# Patient Record
Sex: FEMALE | Race: WHITE
[De-identification: names, ages, dates, MRNs, and addresses within clinical notes are randomized per-mention and may not be internally consistent; named-entity substitution may affect disease eponyms.]

---

## 2018-10-27 ENCOUNTER — HOSPITAL ENCOUNTER (EMERGENCY)
Dept: HOSPITAL 75 - ER | Age: 33
LOS: 1 days | Discharge: HOME | End: 2018-10-28
Payer: SELF-PAY

## 2018-10-27 VITALS — HEIGHT: 62 IN | WEIGHT: 130 LBS | BODY MASS INDEX: 23.92 KG/M2

## 2018-10-27 DIAGNOSIS — Z88.1: ICD-10-CM
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
## 2018-10-27 DIAGNOSIS — M79.641: ICD-10-CM

## 2018-10-27 DIAGNOSIS — S60.221A: Primary | ICD-10-CM

## 2018-10-27 DIAGNOSIS — F17.210: ICD-10-CM

## 2018-10-27 PROCEDURE — 73130 X-RAY EXAM OF HAND: CPT

## 2018-10-28 VITALS — SYSTOLIC BLOOD PRESSURE: 114 MMHG | DIASTOLIC BLOOD PRESSURE: 72 MMHG
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
## 2019-09-25 ENCOUNTER — HOSPITAL ENCOUNTER (EMERGENCY)
Dept: HOSPITAL 75 - ER | Age: 34
Discharge: HOME | End: 2019-09-25
Payer: SELF-PAY

## 2019-09-25 VITALS — WEIGHT: 125.22 LBS | HEIGHT: 63.78 IN | BODY MASS INDEX: 21.64 KG/M2

## 2019-09-25 VITALS — DIASTOLIC BLOOD PRESSURE: 81 MMHG | SYSTOLIC BLOOD PRESSURE: 126 MMHG

## 2019-09-25 DIAGNOSIS — F17.210: ICD-10-CM

## 2019-09-25 DIAGNOSIS — M50.123: Primary | ICD-10-CM

## 2019-09-25 DIAGNOSIS — Z88.1: ICD-10-CM

## 2019-09-25 LAB
BARBITURATES UR QL: NEGATIVE
BENZODIAZ UR QL SCN: NEGATIVE
COCAINE UR QL: NEGATIVE
METHADONE UR QL SCN: NEGATIVE
METHAMPHETAMINE SCREEN URINE S: NEGATIVE
OPIATES UR QL SCN: NEGATIVE
OXYCODONE UR QL: NEGATIVE
PROPOXYPH UR QL: NEGATIVE
TRICYCLICS UR QL SCN: NEGATIVE

## 2019-09-25 PROCEDURE — 72125 CT NECK SPINE W/O DYE: CPT

## 2019-09-25 PROCEDURE — 80306 DRUG TEST PRSMV INSTRMNT: CPT

## 2019-09-25 PROCEDURE — 73030 X-RAY EXAM OF SHOULDER: CPT

## 2019-09-25 PROCEDURE — 84703 CHORIONIC GONADOTROPIN ASSAY: CPT

## 2019-09-25 NOTE — ED NECK-BACK PAIN/INJURY
General


Chief Complaint:  Back Problems


Stated Complaint:  BACK PAIN


Nursing Triage Note:  


Pt to triage with C/O upper back pain/left shoulder pain x 1.5 wks. Pt reports 


going to [facility] urgent care on 19, prescribed prednisone, flexiril and 


alieve. Pt reports pain unrelieved. Pt then went to urgent care again 19, 


pt got a chest x-ray which was unremarkable, instructed to d/c prednisone, 


recieved a steriod shot. Pt rates pain 9/10 at this time, no Hx of chronic back 


pain.


Nursing Sepsis Screen:  No Definite Risk





History of Present Illness


Date Seen by Provider:  Sep 25, 2019


Time Seen by Provider:  11:20


Initial Comments


34 year old female reports back, rhomboid and shoulder pain for 10 days. She has

been seen twice over the last day at urgent care, she is received prednisone, 

Flexeril, naproxen 500 mg and a steroid shot. She has had no improvement in her 

symptoms. She denies an injury. She is right-hand dominant.


Location:  C-Spine, Paraspinous Muscles


Timing/Duration:  1 Week


Severity:  Moderate


Pain/Injury Location:  Upper Extremity (left ), Neck


Radiation:  Other (left arm )


Method of Injury:  Unknown (Awoke with pain on )


Associated Symptoms:  muscle spasms; No lower back pain, No loss of bladder 

control, No loss of bowel control; other (paresthesia tingling in the left upper

extremity.)





Allergies and Home Medications


Allergies


Coded Allergies:  


     tetracycline (Verified  Allergy, Unknown, 10/28/18)





Home Medications


Cyclobenzaprine HCl 10 Mg Tablet, 10 MG PO Q8H PRN for SPASMS


   Prescribed by: RAYMON AGEE on 19 1248


Naproxen 500 Mg Tablet, 500 MG PO BID


   Prescribed by: SAMMY ONEIL on 10/28/18 0112


Tramadol HCl 50 Mg Tablet, 50 MG PO Q6H PRN for PAIN


   Prescribed by: RAYMON AGEE on 19 1248





Patient Home Medication List


Home Medication List Reviewed:  Yes





Review of Systems


Constitutional:  no symptoms reported, see HPI


Genitourinary:  no symptoms reported, see HPI


Pregnant:  No


Musculoskeletal:  see HPI, muscle pain, muscle weakness (left UE), neck pain


Skin:  no symptoms reported, see HPI





All Other Systems Reviewed


Negative Unless Noted:  Yes





Past Medical-Social-Family Hx


Past Med/Social Hx:  Reviewed Nursing Past Med/Soc Hx


Patient Social History


Alcohol Use:  Rarely Uses


Recreational Drug Use:  No


Smoking Status:  Current Everyday Smoker


Type Used:  Cigarettes


2nd Hand Smoke Exposure:  Yes


Recent Foreign Travel:  No


Contact w/Someone Who Travel:  No


Recent Infectious Disease Expo:  No


Recent Hopitalizations:  No


Physical Abuse:  No


Sexual Abuse:  No


Mistreated:  No


Fear:  No





Seasonal Allergies


Seasonal Allergies:  No





Past Medical History


Surgeries:  No


Respiratory:  No


Cardiac:  No


Neurological:  No


Reproductive Disorders:  No


Genitourinary:  No


Gastrointestinal:  No


Musculoskeletal:  No


Endocrine:  No


HEENT:  No


Cancer:  No


Psychosocial:  No


Integumentary:  No


Blood Disorders:  No





Physical Exam


Vital Signs





Vital Signs - First Documented








 19





 10:59


 


Temp 36.9


 


Pulse 90


 


Resp 19


 


B/P (MAP) 124/83 (97)


 


Pulse Ox 99


 


O2 Delivery Room Air





Capillary Refill : Less Than 3 Seconds


Height, Weight, BMI


Height: 5'2.00"


Weight: 130lbs. oz. 58.352096ui; 21.00 BMI


Method:Stated


General Appearance:  No Apparent Distress, WD/WN


HEENT:  PERRL/EOMI, TMs Normal, Normal ENT Inspection, Pharynx Normal


Neck:  Normal Inspection; No Carotid Bruit; Limited Range of Motion, Tender 

Lateral (left), Tender Midline


Cardiovascular:  Regular Rate, Rhythm, No Edema, No Murmur, Normal Peripheral 

Pulses


Respiratory:  Chest Non Tender, Lungs Clear, Normal Breath Sounds


Gastrointestinal:  Normal Bowel Sounds, Non Tender, Soft


Neurologic/Psychiatric:  Alert, Oriented x3, No Motor/Sensory Deficits, Normal 

Mood/Affect


Skin:  Normal Color, Warm/Dry





Progress/Results/Core Measures


Results/Orders


Lab Results





Laboratory Tests








Test


 19


11:33 Range/Units


 


 


Urine Opiates Screen NEGATIVE  NEGATIVE  


 


Urine Oxycodone Screen NEGATIVE  NEGATIVE  


 


Urine Methadone Screen NEGATIVE  NEGATIVE  


 


Urine Propoxyphene Screen NEGATIVE  NEGATIVE  


 


Urine Barbiturates Screen NEGATIVE  NEGATIVE  


 


Ur Tricyclic Antidepressants


Screen NEGATIVE 


 NEGATIVE  





 


Urine Phencyclidine Screen NEGATIVE  NEGATIVE  


 


Urine Amphetamines Screen NEGATIVE  NEGATIVE  


 


Urine Methamphetamines Screen NEGATIVE  NEGATIVE  


 


Urine Benzodiazepines Screen NEGATIVE  NEGATIVE  


 


Urine Cocaine Screen NEGATIVE  NEGATIVE  


 


Urine Cannabinoids Screen NEGATIVE  NEGATIVE  








My Orders





Orders - ANUEL,RAYMON ARNP


Orphenadrine Injection (Norflex Injectio (19 11:30)


Ketorolac Injection (Toradol Injection) (19 11:30)


Drug Screen Stat (Urine) (19 11:30)


Urine Pregnancy Bedside (19 11:30)


Ct Cervical Spine Wo (19 11:30)


Shoulder, Left, 3 Views (19 11:30)


Tramadol Tablet (Ultram Tablet) (19 12:45)





Medications Given in ED





Current Medications








 Medications  Dose


 Ordered  Sig/Dinesh


 Route  Start Time


 Stop Time Status Last Admin


Dose Admin


 


 Ketorolac


 Tromethamine  30 mg  ONCE  ONCE


 IM  19 11:30


 19 11:33 DC 19 11:43


30 MG


 


 Orphenadrine


 Citrate  60 mg  ONCE  ONCE


 IM  19 11:30


 19 11:33 DC 19 11:42


60 MG


 


 Tramadol HCl  50 mg  ONCE  ONCE


 PO  19 12:45


 19 12:46 DC 19 12:40


50 MG








Vital Signs/I&O











 19





 10:59 12:51


 


Temp 36.9 36.9


 


Pulse 90 90


 


Resp  19


 


B/P (MAP) 124/83 (97) 126/81 (97)


 


Pulse Ox 99 100


 


O2 Delivery Room Air Simple Mask














Blood Pressure Mean:                    97











Progress


Progress Note :  


   Time:  11:20


Progress Note


Patient seen and evaluated, will give Norflex 60 milligrams IM, Toradol 30 mg IM

and obtained a CT of the cervical spine and x-rays of the left shoulder.


1200 patient reports trace improvement in her symptoms since the Norflex and 

Toradol. Awaiting CT results.


1215 CT results show a small disc protrusion at C6 7 the left, x-rays of the 

left shoulder negative. Results discussed with the patient. We'll give tramadol 

50 mg orally for pain.


1230 discharge instructions and return precautions reviewed with the patient.





Diagnostic Imaging





   Diagonstic Imaging:  Xray


Comments


NAME:   ANRENDRA YOUSSEF JOYCE


MED REC#:   [id number]


ACCOUNT#:   [id number]


PT STATUS:   REG ER


:   1985


PHYSICIAN:   RAYMON AGEE


ADMIT DATE:   19/ER


                                   ***Draft***


Date of Exam:19





SHOULDER, LEFT, 3 VIEWS








INDICATION:  Waking to left shoulder pain x2 weeks.





TECHNIQUE: Three  views of the  left shoulder  





CORRELATION STUDY:  None





FINDINGS: 


The glenohumeral and acromioclavicular alignment are maintained


and unremarkable. There is no evidence for acute fracture or


dislocation.  The visualized soft tissues are unremarkable.





IMPRESSION: 


1.  Unremarkable examination left shoulder.     





  Dictated on workstation # [id number]








Dict:   19 1212


Trans:   193


 [id number]





Interpreted by:     DISHA RILEY DO


Electronically signed by:


   Reviewed:  Reviewed by Me








   Diagonstic Imaging:  CT


   Plain Films/CT/US/NM/MRI:  c-spine


Comments


NAME:   NARENDRA YOUSSEF


MED REC#:   [id number]


ACCOUNT#:   [id number]


PT STATUS:   REG ER


:   1985


PHYSICIAN:   RAYMON AGEE


ADMIT DATE:   19/ER


                                   ***Draft***


Date of Exam:19





CT CERVICAL SPINE WO








PROCEDURE:  CT cervical spine without contrast.





TECHNIQUE: Multiple contiguous axial images were obtained through


the cervical spine without the use of intravenous contrast.


Sagittal and coronal reformations were then performed. Auto


Exposure Controls were utilized during the CT exam to meet ALARA


standards for radiation dose reduction. 





INDICATION:  Neck pain 2 weeks' history, no known discrete


injury.





Focal left paramedian posterior disc protrusion at the C6-C7


level impinging upon the left lateral recess as well as indenting


the left ventral thecal sac and resulting in at least moderate


stenosis of the left-sided foramen at C6-C7. There is spondylosis


with disc space narrowing and mild endplate osteophytes at that


level however the bony hypertrophy did not contribute


substantially to the stenosis. The remaining levels were


unremarkable. There is no fracture and no bony destruction. 





IMPRESSION:


Left paramedian disc protrusion and herniation C6-C7 with central


canal, left lateral recess and left foraminal stenosis. No acute


bony abnormality.














  Dictated on workstation # [id number]








Dict:   19 1212


Trans:   19 1223


EUGENE [id number]





Interpreted by:     IVÁN PARSONS


   Reviewed:  Reviewed by Me





Departure


Impression





   Primary Impression:  


   Cervical radiculopathy


   Additional Impression:  


   Herniated nucleus pulposus, C6-7 left


Disposition:  01 HOME, SELF-CARE


Condition:  Improved





Departure-Patient Inst.


Decision time for Depature:  12:30


Referrals:  


NO,LOCAL PHYSICIAN (PCP/Family)


Primary Care Physician


Patient Instructions:  Generalized Neck Pain (DC), Herniated Disc (DC)





Add. Discharge Instructions:  


Alternate heat and ice to your next 20 minutes at a time.


Consider evaluation by a chiropractor.


Continue to take the Naproxen 500 mg twice daily with food.


Take Flexeril 10 mg as needed every 8 hours for muscle spasms. 


Use Tylenol 650 mg every 8 hours for pain. 


For worsening pain, Ultram 50 mg every 8 hours. 


Use Tiger Balm rub or patches to neck, left shoulder blade. 


Follow up with your Primary Care Provider. 


Call [facility] for an appt with Dr. Trivedi or [facility]: Dr. Li

or Jeny. 


Return to emergency department for new, urgent health care needs.





All discharge instructions reviewed with patient and/or family. Voiced 

understanding.


Scripts


Tramadol HCl (Tramadol HCl) 50 Mg Tablet


50 MG PO Q6H PRN for PAIN, #30 TAB 0 Refills


   Prov: RAYMON AGEE         19 


Cyclobenzaprine HCl (Cyclobenzaprine HCl) 10 Mg Tablet


10 MG PO Q8H PRN for SPASMS, #30 TAB 0 Refills


   Prov: RAYMON AGEE         19











RAYMON AGEE                  Sep 25, 2019 11:40

## 2019-09-25 NOTE — DIAGNOSTIC IMAGING REPORT
INDICATION:  Waking to left shoulder pain x2 weeks.



TECHNIQUE: Three  views of the  left shoulder  



CORRELATION STUDY:  None



FINDINGS: 

The glenohumeral and acromioclavicular alignment are maintained

and unremarkable. There is no evidence for acute fracture or

dislocation.  The visualized soft tissues are unremarkable.



IMPRESSION: 

1.  Unremarkable examination left shoulder.     



Dictated by: 



  Dictated on workstation # [id number]

## 2019-09-25 NOTE — DIAGNOSTIC IMAGING REPORT
PROCEDURE:  CT cervical spine without contrast.



TECHNIQUE: Multiple contiguous axial images were obtained through

the cervical spine without the use of intravenous contrast.

Sagittal and coronal reformations were then performed. Auto

Exposure Controls were utilized during the CT exam to meet ALARA

standards for radiation dose reduction. 



INDICATION:  Neck pain 2 weeks' history, no known discrete

injury.



Focal left paramedian posterior disc protrusion at the C6-C7

level impinging upon the left lateral recess as well as indenting

the left ventral thecal sac and resulting in at least moderate

stenosis of the left-sided foramen at C6-C7. There is spondylosis

with disc space narrowing and mild endplate osteophytes at that

level however the bony hypertrophy did not contribute

substantially to the stenosis. The remaining levels were

unremarkable. There is no fracture and no bony destruction. 



IMPRESSION:

Left paramedian disc protrusion and herniation C6-C7 with central

canal, left lateral recess and left foraminal stenosis. No acute

bony abnormality.







Dictated by: 



  Dictated on workstation # [id number]

## 2022-03-03 ENCOUNTER — HOSPITAL ENCOUNTER (INPATIENT)
Dept: HOSPITAL 75 - LDRP | Age: 37
LOS: 2 days | Discharge: HOME | End: 2022-03-05
Attending: OBSTETRICS & GYNECOLOGY | Admitting: OBSTETRICS & GYNECOLOGY
Payer: COMMERCIAL

## 2022-03-03 VITALS — SYSTOLIC BLOOD PRESSURE: 113 MMHG | DIASTOLIC BLOOD PRESSURE: 60 MMHG

## 2022-03-03 VITALS — SYSTOLIC BLOOD PRESSURE: 132 MMHG | DIASTOLIC BLOOD PRESSURE: 93 MMHG

## 2022-03-03 VITALS — SYSTOLIC BLOOD PRESSURE: 116 MMHG | DIASTOLIC BLOOD PRESSURE: 56 MMHG

## 2022-03-03 VITALS — SYSTOLIC BLOOD PRESSURE: 99 MMHG | DIASTOLIC BLOOD PRESSURE: 57 MMHG

## 2022-03-03 VITALS — DIASTOLIC BLOOD PRESSURE: 44 MMHG | SYSTOLIC BLOOD PRESSURE: 74 MMHG

## 2022-03-03 VITALS — SYSTOLIC BLOOD PRESSURE: 128 MMHG | DIASTOLIC BLOOD PRESSURE: 72 MMHG

## 2022-03-03 VITALS — SYSTOLIC BLOOD PRESSURE: 100 MMHG | DIASTOLIC BLOOD PRESSURE: 57 MMHG

## 2022-03-03 VITALS — SYSTOLIC BLOOD PRESSURE: 123 MMHG | DIASTOLIC BLOOD PRESSURE: 68 MMHG

## 2022-03-03 VITALS — DIASTOLIC BLOOD PRESSURE: 59 MMHG | SYSTOLIC BLOOD PRESSURE: 112 MMHG

## 2022-03-03 VITALS — SYSTOLIC BLOOD PRESSURE: 105 MMHG | DIASTOLIC BLOOD PRESSURE: 60 MMHG

## 2022-03-03 VITALS — SYSTOLIC BLOOD PRESSURE: 134 MMHG | DIASTOLIC BLOOD PRESSURE: 64 MMHG

## 2022-03-03 VITALS — DIASTOLIC BLOOD PRESSURE: 56 MMHG | SYSTOLIC BLOOD PRESSURE: 107 MMHG

## 2022-03-03 VITALS — DIASTOLIC BLOOD PRESSURE: 78 MMHG | SYSTOLIC BLOOD PRESSURE: 110 MMHG

## 2022-03-03 VITALS — DIASTOLIC BLOOD PRESSURE: 57 MMHG | SYSTOLIC BLOOD PRESSURE: 101 MMHG

## 2022-03-03 VITALS — DIASTOLIC BLOOD PRESSURE: 64 MMHG | SYSTOLIC BLOOD PRESSURE: 101 MMHG

## 2022-03-03 VITALS — DIASTOLIC BLOOD PRESSURE: 53 MMHG | SYSTOLIC BLOOD PRESSURE: 98 MMHG

## 2022-03-03 VITALS — DIASTOLIC BLOOD PRESSURE: 54 MMHG | SYSTOLIC BLOOD PRESSURE: 118 MMHG

## 2022-03-03 VITALS — SYSTOLIC BLOOD PRESSURE: 95 MMHG | DIASTOLIC BLOOD PRESSURE: 57 MMHG

## 2022-03-03 VITALS — DIASTOLIC BLOOD PRESSURE: 73 MMHG | SYSTOLIC BLOOD PRESSURE: 126 MMHG

## 2022-03-03 VITALS — DIASTOLIC BLOOD PRESSURE: 51 MMHG | SYSTOLIC BLOOD PRESSURE: 92 MMHG

## 2022-03-03 VITALS — SYSTOLIC BLOOD PRESSURE: 128 MMHG | DIASTOLIC BLOOD PRESSURE: 79 MMHG

## 2022-03-03 VITALS — SYSTOLIC BLOOD PRESSURE: 124 MMHG | DIASTOLIC BLOOD PRESSURE: 58 MMHG

## 2022-03-03 VITALS — SYSTOLIC BLOOD PRESSURE: 117 MMHG | DIASTOLIC BLOOD PRESSURE: 64 MMHG

## 2022-03-03 VITALS — DIASTOLIC BLOOD PRESSURE: 60 MMHG | SYSTOLIC BLOOD PRESSURE: 103 MMHG

## 2022-03-03 VITALS — SYSTOLIC BLOOD PRESSURE: 101 MMHG | DIASTOLIC BLOOD PRESSURE: 57 MMHG

## 2022-03-03 VITALS — DIASTOLIC BLOOD PRESSURE: 68 MMHG | SYSTOLIC BLOOD PRESSURE: 124 MMHG

## 2022-03-03 VITALS — DIASTOLIC BLOOD PRESSURE: 55 MMHG | SYSTOLIC BLOOD PRESSURE: 106 MMHG

## 2022-03-03 VITALS — DIASTOLIC BLOOD PRESSURE: 63 MMHG | SYSTOLIC BLOOD PRESSURE: 119 MMHG

## 2022-03-03 VITALS — DIASTOLIC BLOOD PRESSURE: 58 MMHG | SYSTOLIC BLOOD PRESSURE: 96 MMHG

## 2022-03-03 VITALS — DIASTOLIC BLOOD PRESSURE: 72 MMHG | SYSTOLIC BLOOD PRESSURE: 124 MMHG

## 2022-03-03 VITALS — DIASTOLIC BLOOD PRESSURE: 59 MMHG | SYSTOLIC BLOOD PRESSURE: 117 MMHG

## 2022-03-03 VITALS — DIASTOLIC BLOOD PRESSURE: 73 MMHG | SYSTOLIC BLOOD PRESSURE: 118 MMHG

## 2022-03-03 VITALS — SYSTOLIC BLOOD PRESSURE: 105 MMHG | DIASTOLIC BLOOD PRESSURE: 54 MMHG

## 2022-03-03 VITALS — DIASTOLIC BLOOD PRESSURE: 59 MMHG | SYSTOLIC BLOOD PRESSURE: 105 MMHG

## 2022-03-03 VITALS — SYSTOLIC BLOOD PRESSURE: 111 MMHG | DIASTOLIC BLOOD PRESSURE: 57 MMHG

## 2022-03-03 VITALS — DIASTOLIC BLOOD PRESSURE: 54 MMHG | SYSTOLIC BLOOD PRESSURE: 108 MMHG

## 2022-03-03 VITALS — SYSTOLIC BLOOD PRESSURE: 116 MMHG | DIASTOLIC BLOOD PRESSURE: 61 MMHG

## 2022-03-03 VITALS — DIASTOLIC BLOOD PRESSURE: 59 MMHG | SYSTOLIC BLOOD PRESSURE: 99 MMHG

## 2022-03-03 VITALS — SYSTOLIC BLOOD PRESSURE: 113 MMHG | DIASTOLIC BLOOD PRESSURE: 68 MMHG

## 2022-03-03 VITALS — SYSTOLIC BLOOD PRESSURE: 74 MMHG | DIASTOLIC BLOOD PRESSURE: 41 MMHG

## 2022-03-03 VITALS — SYSTOLIC BLOOD PRESSURE: 119 MMHG | DIASTOLIC BLOOD PRESSURE: 76 MMHG

## 2022-03-03 VITALS — SYSTOLIC BLOOD PRESSURE: 115 MMHG | DIASTOLIC BLOOD PRESSURE: 74 MMHG

## 2022-03-03 VITALS — DIASTOLIC BLOOD PRESSURE: 57 MMHG | SYSTOLIC BLOOD PRESSURE: 99 MMHG

## 2022-03-03 VITALS — DIASTOLIC BLOOD PRESSURE: 70 MMHG | SYSTOLIC BLOOD PRESSURE: 118 MMHG

## 2022-03-03 VITALS — SYSTOLIC BLOOD PRESSURE: 117 MMHG | DIASTOLIC BLOOD PRESSURE: 74 MMHG

## 2022-03-03 VITALS — DIASTOLIC BLOOD PRESSURE: 60 MMHG | SYSTOLIC BLOOD PRESSURE: 128 MMHG

## 2022-03-03 VITALS — DIASTOLIC BLOOD PRESSURE: 68 MMHG | SYSTOLIC BLOOD PRESSURE: 98 MMHG

## 2022-03-03 VITALS — DIASTOLIC BLOOD PRESSURE: 66 MMHG | SYSTOLIC BLOOD PRESSURE: 118 MMHG

## 2022-03-03 VITALS — SYSTOLIC BLOOD PRESSURE: 113 MMHG | DIASTOLIC BLOOD PRESSURE: 73 MMHG

## 2022-03-03 VITALS — DIASTOLIC BLOOD PRESSURE: 55 MMHG | SYSTOLIC BLOOD PRESSURE: 112 MMHG

## 2022-03-03 VITALS — SYSTOLIC BLOOD PRESSURE: 93 MMHG | DIASTOLIC BLOOD PRESSURE: 57 MMHG

## 2022-03-03 VITALS — SYSTOLIC BLOOD PRESSURE: 104 MMHG | DIASTOLIC BLOOD PRESSURE: 56 MMHG

## 2022-03-03 VITALS — DIASTOLIC BLOOD PRESSURE: 75 MMHG | SYSTOLIC BLOOD PRESSURE: 118 MMHG

## 2022-03-03 VITALS — BODY MASS INDEX: 31 KG/M2 | HEIGHT: 64.96 IN | WEIGHT: 186.07 LBS

## 2022-03-03 VITALS — DIASTOLIC BLOOD PRESSURE: 71 MMHG | SYSTOLIC BLOOD PRESSURE: 118 MMHG

## 2022-03-03 VITALS — SYSTOLIC BLOOD PRESSURE: 95 MMHG | DIASTOLIC BLOOD PRESSURE: 54 MMHG

## 2022-03-03 VITALS — SYSTOLIC BLOOD PRESSURE: 116 MMHG | DIASTOLIC BLOOD PRESSURE: 67 MMHG

## 2022-03-03 DIAGNOSIS — Z88.1: ICD-10-CM

## 2022-03-03 DIAGNOSIS — Z3A.39: ICD-10-CM

## 2022-03-03 LAB
BASOPHILS # BLD AUTO: 0 10^3/UL (ref 0–0.1)
BASOPHILS NFR BLD AUTO: 0 % (ref 0–10)
EOSINOPHIL # BLD AUTO: 0.1 10^3/UL (ref 0–0.3)
EOSINOPHIL NFR BLD AUTO: 1 % (ref 0–10)
HCT VFR BLD CALC: 33 % (ref 35–52)
HGB BLD-MCNC: 10.8 G/DL (ref 11.5–16)
LYMPHOCYTES # BLD AUTO: 2.5 10^3/UL (ref 1–4)
LYMPHOCYTES NFR BLD AUTO: 21 % (ref 12–44)
MANUAL DIFFERENTIAL PERFORMED BLD QL: NO
MCH RBC QN AUTO: 29 PG (ref 25–34)
MCHC RBC AUTO-ENTMCNC: 33 G/DL (ref 32–36)
MCV RBC AUTO: 89 FL (ref 80–99)
MONOCYTES # BLD AUTO: 0.8 10^3/UL (ref 0–1)
MONOCYTES NFR BLD AUTO: 7 % (ref 0–12)
NEUTROPHILS # BLD AUTO: 8.6 10^3/UL (ref 1.8–7.8)
NEUTROPHILS NFR BLD AUTO: 71 % (ref 42–75)
PLATELET # BLD: 289 10^3/UL (ref 130–400)
PMV BLD AUTO: 10.5 FL (ref 9–12.2)
WBC # BLD AUTO: 12.2 10^3/UL (ref 4.3–11)

## 2022-03-03 PROCEDURE — 86900 BLOOD TYPING SEROLOGIC ABO: CPT

## 2022-03-03 PROCEDURE — 86850 RBC ANTIBODY SCREEN: CPT

## 2022-03-03 PROCEDURE — 36415 COLL VENOUS BLD VENIPUNCTURE: CPT

## 2022-03-03 PROCEDURE — 86780 TREPONEMA PALLIDUM: CPT

## 2022-03-03 PROCEDURE — 3E033VJ INTRODUCTION OF OTHER HORMONE INTO PERIPHERAL VEIN, PERCUTANEOUS APPROACH: ICD-10-PCS | Performed by: OBSTETRICS & GYNECOLOGY

## 2022-03-03 PROCEDURE — 86901 BLOOD TYPING SEROLOGIC RH(D): CPT

## 2022-03-03 PROCEDURE — 85025 COMPLETE CBC W/AUTO DIFF WBC: CPT

## 2022-03-03 RX ADMIN — ACETAMINOPHEN SCH MG: 500 TABLET ORAL at 20:52

## 2022-03-03 RX ADMIN — SODIUM CHLORIDE, SODIUM LACTATE, POTASSIUM CHLORIDE, CALCIUM CHLORIDE, AND DEXTROSE MONOHYDRATE SCH MLS/HR: 600; 310; 30; 20; 5 INJECTION, SOLUTION INTRAVENOUS at 18:10

## 2022-03-03 RX ADMIN — WATER SCH MLS/HR: 1 INJECTION INTRAMUSCULAR; INTRAVENOUS; SUBCUTANEOUS at 19:51

## 2022-03-03 RX ADMIN — SODIUM CHLORIDE, SODIUM LACTATE, POTASSIUM CHLORIDE, CALCIUM CHLORIDE, AND DEXTROSE MONOHYDRATE SCH MLS/HR: 600; 310; 30; 20; 5 INJECTION, SOLUTION INTRAVENOUS at 13:39

## 2022-03-03 RX ADMIN — SODIUM CHLORIDE, SODIUM LACTATE, POTASSIUM CHLORIDE, CALCIUM CHLORIDE, AND DEXTROSE MONOHYDRATE SCH MLS/HR: 600; 310; 30; 20; 5 INJECTION, SOLUTION INTRAVENOUS at 08:00

## 2022-03-03 RX ADMIN — WATER SCH MLS/HR: 1 INJECTION INTRAMUSCULAR; INTRAVENOUS; SUBCUTANEOUS at 13:32

## 2022-03-03 RX ADMIN — WATER SCH MLS/HR: 1 INJECTION INTRAMUSCULAR; INTRAVENOUS; SUBCUTANEOUS at 15:17

## 2022-03-03 RX ADMIN — WATER SCH MLS/HR: 1 INJECTION INTRAMUSCULAR; INTRAVENOUS; SUBCUTANEOUS at 17:45

## 2022-03-03 RX ADMIN — IBUPROFEN SCH MG: 800 TABLET, FILM COATED ORAL at 23:07

## 2022-03-03 NOTE — DISCHARGE INST-SURGICAL
Discharge Inst-Surgical


Depart Medication/Instructions


New, Converted or Re-Newed RX:  Transmitted to Pharmacy





Consults/Follow Up


Patient Instructions:  


As directed


Orders & Referrals





Follow Up Appt:


Call to make follow up appt. for patient in 4 weeks.





Activity 


Per routine post vaginal delivery instructions.





Please call in RX to patient pharmacy.





Diet as tolerated





Patient may shower or tub bathe as desired.





Diet


Discharge Diet:  No Restrictions











YARA ESTRADA MD        Mar 3, 2022 08:30

## 2022-03-03 NOTE — HISTORY & PHYSICAL
History and Physical


Date Seen by Provider:  Mar 3, 2022


Time Seen by Provider:  12:00


This patient is a 36-year-old female at 39+ weeks gestation admitted for 

induction of labor electively.  She has had no problems with this pregnancy her 

GBS culture was positive.  She denies rupture membranes or bleeding.  She does 

feel baby moving has occasional contractions





Allergies are to tetracycline





Medications are prenatal vitamin





Medical social and surgical history is are per the antepartum record





HEENT exam is normal


Neck is supple no lymphadenopathy no thyromegaly


Abdomen is gravid soft nontender nondistended


Extremities show no clubbing or cyanosis.  There is no Homans' sign.





Pelvic exam is pending





Assessment   term pregnancy at 39+ weeks gestation admitted for induction of 

labor.  We anticipate a vaginal delivery


39 weeks gestation admitted for elective induction of labor





Allergies and Home Medications


Allergies


Coded Allergies:  


     tetracycline (Verified  Allergy, Unknown, 10/28/18)





Patient Home Medication List


Home Medication List Reviewed:  Yes


Cyclobenzaprine HCl (Cyclobenzaprine HCl) 10 Mg Tablet, 10 MG PO Q8H PRN for 

SPASMS


   Prescribed by: RAYMON AGEE on 9/25/19 1248


Docusate Sodium (Colace) 100 Mg Capsule, 100 MG PO BID


   Prescribed by: YARA EAST on 3/3/22 0825


Ibuprofen (Ibuprofen) 800 Mg Tablet, 800 MG PO Q6H PRN for PAIN


   Prescribed by: YARA EAST on 3/3/22 0825


Naproxen (Naproxen) 500 Mg Tablet, 500 MG PO BID


   Prescribed by: SAMMY OENIL on 10/28/18 0112


Tramadol HCl (Tramadol HCl) 50 Mg Tablet, 50 MG PO Q6H PRN for PAIN


   Prescribed by: RAYMON AGEE on 9/25/19 1248











YARA ESTRADA MD        Mar 3, 2022 08:32

## 2022-03-04 VITALS — DIASTOLIC BLOOD PRESSURE: 56 MMHG | SYSTOLIC BLOOD PRESSURE: 114 MMHG

## 2022-03-04 VITALS — DIASTOLIC BLOOD PRESSURE: 58 MMHG | SYSTOLIC BLOOD PRESSURE: 104 MMHG

## 2022-03-04 VITALS — DIASTOLIC BLOOD PRESSURE: 57 MMHG | SYSTOLIC BLOOD PRESSURE: 112 MMHG

## 2022-03-04 VITALS — DIASTOLIC BLOOD PRESSURE: 50 MMHG | SYSTOLIC BLOOD PRESSURE: 105 MMHG

## 2022-03-04 VITALS — DIASTOLIC BLOOD PRESSURE: 59 MMHG | SYSTOLIC BLOOD PRESSURE: 115 MMHG

## 2022-03-04 VITALS — DIASTOLIC BLOOD PRESSURE: 55 MMHG | SYSTOLIC BLOOD PRESSURE: 116 MMHG

## 2022-03-04 RX ADMIN — IBUPROFEN SCH MG: 800 TABLET, FILM COATED ORAL at 09:54

## 2022-03-04 RX ADMIN — IBUPROFEN SCH MG: 800 TABLET, FILM COATED ORAL at 21:26

## 2022-03-04 RX ADMIN — DOCUSATE SODIUM SCH MG: 100 CAPSULE ORAL at 21:26

## 2022-03-04 RX ADMIN — ACETAMINOPHEN SCH MG: 500 TABLET ORAL at 04:30

## 2022-03-04 RX ADMIN — ACETAMINOPHEN SCH MG: 500 TABLET ORAL at 17:18

## 2022-03-04 RX ADMIN — ACETAMINOPHEN SCH MG: 500 TABLET ORAL at 10:41

## 2022-03-04 RX ADMIN — DOCUSATE SODIUM SCH MG: 100 CAPSULE ORAL at 10:43

## 2022-03-04 RX ADMIN — IBUPROFEN SCH MG: 800 TABLET, FILM COATED ORAL at 16:25

## 2022-03-04 NOTE — PROGRESS NOTE
Standard Progress Note


Progress Notes/Assess & Plan


Date Seen by a Provider:  Mar 4, 2022


Time Seen by a Provider:  10:52


Progress/Assessment & Plan


This patient is without complaint.  She is ambulating, voiding, tolerating oral 

intake well has good pain control.








                          VS - Last 72 Hours, by Label








 3/3/22 3/3/22 3/3/22 3/3/22





 07:30 08:00 09:04 10:13


 


Temp 36.4 36.4  


 


Pulse 105 105 80 76


 


Resp 18 18 18 18


 


B/P (MAP)  134/64 (87) 123/68 (86) 116/61 (79)


 


Pulse Ox 100 100  


 


O2 Delivery Room Air Room Air Room Air Room Air


 


    





 3/3/22 3/3/22 3/3/22 3/3/22





 10:29 10:45 10:59 11:13


 


Pulse 81 81 75 80


 


Resp 18 18 18 18


 


B/P (MAP) 126/73 (90) 116/67 (83) 118/66 (83) 117/74 (88)


 


O2 Delivery Room Air Room Air Room Air Room Air





 3/3/22 3/3/22 3/3/22 3/3/22





 11:28 11:43 12:00 12:15


 


Temp   36.3 


 


Pulse 70 70 80 88


 


Resp 18 18 18 18


 


B/P (MAP) 119/76 (90) 116/56 (76) 117/59 (78) 119/63 (81)


 


O2 Delivery Room Air Room Air Room Air Room Air


 


    





 3/3/22 3/3/22 3/3/22 3/3/22





 12:38 12:43 12:46 12:49


 


Pulse 75 78 73 75


 


Resp 18 18 18 18


 


B/P (MAP) 113/73 (86) 115/74 (88) 118/73 (88) 118/75 (89)


 


Pulse Ox 100 99 99 99


 


O2 Delivery Room Air Room Air Room Air Room Air





 3/3/22 3/3/22 3/3/22 3/3/22





 12:55 13:00 13:02 13:04


 


Pulse 110 88 74 70


 


Resp 18 18 18 18


 


B/P (MAP) 105/60 (75) 92/51 (65) 74/41 (52) 74/44 (54)


 


Pulse Ox 99 98 97 97


 


O2 Delivery Room Air Room Air Room Air Room Air





 3/3/22 3/3/22 3/3/22 3/3/22





 13:06 13:10 13:11 13:14


 


Pulse 50 70 66 69


 


Resp 18 18 18 18


 


B/P (MAP) 99/57 (71) 93/57 (69) 100/57 (71) 95/57 (70)


 


Pulse Ox 93 98 98 100


 


O2 Delivery Room Air Room Air Room Air Room Air





 3/3/22 3/3/22 3/3/22 3/3/22





 13:17 13:20 13:23 13:25


 


Temp    36.3


 


Pulse 85 93 95 84


 


Resp 18 18 18 18


 


B/P (MAP) 105/59 (74) 98/53 (68) 95/54 (68) 101/57 (72)


 


Pulse Ox 100 100 100 99


 


O2 Delivery Room Air Room Air Room Air Room Air


 


    





 3/3/22 3/3/22 3/3/22 3/3/22





 13:28 13:32 13:39 13:43


 


Pulse 75 106 83 85


 


Resp 18 18 18 18


 


B/P (MAP) 99/57 (71) 101/64 (76) 104/56 (72) 111/57 (75)


 


Pulse Ox 99 99 96 92


 


O2 Delivery Room Air Room Air Room Air Room Air





 3/3/22 3/3/22 3/3/22 3/3/22





 13:49 13:55 14:03 14:08


 


Pulse 65 87 77 91


 


Resp 18 18 18 18


 


B/P (MAP) 128/72 (90) 118/54 (75) 112/59 (76) 98/68 (78)


 


Pulse Ox 100 96 92 99


 


O2 Delivery Room Air Room Air Room Air Room Air





 3/3/22 3/3/22 3/3/22 3/3/22





 14:29 14:44 14:59 15:00


 


Pulse 93 72 73 86


 


Resp 18 18 18 18


 


B/P (MAP) 99/59 (72) 96/58 (71) 101/57 (72) 110/78 (89)


 


O2 Delivery Room Air Room Air Room Air Room Air





 3/3/22 3/3/22 3/3/22 3/3/22





 15:29 15:43 16:29 16:44


 


Pulse 71 96 103 78


 


Resp 18 18 18 18


 


B/P (MAP) 105/54 (71) 106/55 (72) 103/60 (74) 113/68 (83)


 


O2 Delivery Room Air Room Air Room Air Non Rebreather


 


O2 Flow Rate    15.00





 3/3/22 3/3/22 3/3/22 3/3/22





 17:00 17:13 17:28 17:44


 


Temp  36.1  


 


Pulse 84 75 100 81


 


Resp 18 18 18 18


 


B/P (MAP) 117/64 (81) 113/60 (77) 118/71 (87) 118/70 (86)


 


O2 Delivery Non Rebreather Non Rebreather Non Rebreather Non Rebreather


 


O2 Flow Rate 15.00 15.00 15.00 15.00


 


    





 3/3/22 3/3/22 3/3/22 3/3/22





 17:59 18:13 18:34 18:43


 


Temp  36.2  


 


Pulse 73 98 97 108


 


Resp 18 18 18 18


 


B/P (MAP) 124/68 (86) 128/79 (95) 132/93 (106) 128/60 (82)


 


O2 Delivery Non Rebreather Non Rebreather Room Air Room Air


 


O2 Flow Rate 15.00 15.00  


 


    





 3/3/22 3/3/22 3/3/22 3/3/22





 18:57 19:12 19:27 19:57


 


Pulse 101 104 95 91


 


Resp 18   


 


B/P (MAP) 124/58 (80) 128/60 (82) 124/72 (89) 108/54 (72)


 


O2 Delivery Room Air   





 3/3/22 3/3/22 3/4/22 3/4/22





 20:12 20:27 00:58 04:30


 


Temp  36.8  36.5


 


Pulse 95 99 76 63


 


Resp    18


 


B/P (MAP) 107/56 (73) 112/55 (74) 104/58 (73) 115/59 (77)





Vital signs are stable.  Patient is afebrile.





Fundus is firm below the umbilicus and nontender.


Extremities show no clubbing cyanosis.  There is no Homans' sign.





Assessment and plan   Postpartum day #1 status post term spontaneous vaginal 

livery doing well.  Plans for routine convalescent care


Final Diagnosis


39-week spontaneous vaginal delivery











YARA ESTRADA MD        Mar 4, 2022 10:53

## 2022-03-04 NOTE — OB LABOR & DELIVERY RECORD
Vag Delivery Note


Vag Delivery Note


Date of Delivery: 3/4/22 





Preoperative Diagnosis: Praveena Griffith  is a (36  Gravida3/Para  2 / ,Gestational 

Age (wks)39with []





Postoperative Diagnosis: Same


Surgeon: YARA ESTRADA 





Assistant: []





Anesthesia: [Epidural





Delivery Type: []Spontaneous Vaginal   





Findings: []


Viable [Male] infant, apgars [7 and 8], weight [8 pounds 3 ounces]


Lacerations:None


Intact placenta with 3 vessel cord. No nuchal cord, body cord or shoulder 

dystocia








Estimated Blood Loss: [250] ml





Complications: None





Condition: Stable





Description of Procedure:





The patient is a 36 year old female who presented [As scheduled]. She was 

admitted and informed consent was obtained. Her labor course was remarkable for 

[] She progressed to complete dilatation and began to push. 





She was then set up for delivery. The infant's head was delivered atraumatically

 in the [OA] position. The shoulders and remainder of the infant's body were 

then delivered without difficulty. Upon delivery, the head was held below the 

level of the perineum and the mouth and nares were bulb suctioned. The cord was 

doubly clamped and cut and the infant was handed off to Mom's abdomen. An intact

 placenta with 3-vessel cord delivered via Chris and there was found to be 

minimal bleeding.~ Vigorous fundal massage was performed and the fundus was 

found to be firm. IV oxytocin was given. Examination of the vagina and perineum 

revealed a No laceration, sponge, instrument and needle counts were correct. Mom

 and baby were both in stable condition in the labor suite.





Vitals - Labs


Vital Signs - I&O





Vital Signs








  Date Time  Temp Pulse Resp B/P (MAP) Pulse Ox O2 Delivery O2 Flow Rate FiO2


 


3/4/22 04:30 36.5 63 18 115/59 (77)    


 


3/4/22 00:58  76  104/58 (73)    


 


3/3/22 20:27 36.8 99  112/55 (74)    


 


3/3/22 20:12  95  107/56 (73)    


 


3/3/22 19:57  91  108/54 (72)    


 


3/3/22 19:27  95  124/72 (89)    


 


3/3/22 19:12  104  128/60 (82)    


 


3/3/22 18:57  101 18 124/58 (80)  Room Air  


 


3/3/22 18:43  108 18 128/60 (82)  Room Air  


 


3/3/22 18:34  97 18 132/93 (106)  Room Air  


 


3/3/22 18:13 36.2 98 18 128/79 (95)  Non Rebreather 15.00 


 


3/3/22 17:59  73 18 124/68 (86)  Non Rebreather 15.00 


 


3/3/22 17:44  81 18 118/70 (86)  Non Rebreather 15.00 


 


3/3/22 17:28  100 18 118/71 (87)  Non Rebreather 15.00 


 


3/3/22 17:13 36.1 75 18 113/60 (77)  Non Rebreather 15.00 


 


3/3/22 17:00  84 18 117/64 (81)  Non Rebreather 15.00 


 


3/3/22 16:44  78 18 113/68 (83)  Non Rebreather 15.00 


 


3/3/22 16:29  103 18 103/60 (74)  Room Air  


 


3/3/22 15:43  96 18 106/55 (72)  Room Air  


 


3/3/22 15:29  71 18 105/54 (71)  Room Air  


 


3/3/22 15:00  86 18 110/78 (89)  Room Air  


 


3/3/22 14:59  73 18 101/57 (72)  Room Air  


 


3/3/22 14:44  72 18 96/58 (71)  Room Air  


 


3/3/22 14:29  93 18 99/59 (72)  Room Air  


 


3/3/22 14:08  91 18 98/68 (78) 99 Room Air  


 


3/3/22 14:03  77 18 112/59 (76) 92 Room Air  


 


3/3/22 13:55  87 18 118/54 (75) 96 Room Air  


 


3/3/22 13:49  65 18 128/72 (90) 100 Room Air  


 


3/3/22 13:43  85 18 111/57 (75) 92 Room Air  


 


3/3/22 13:39  83 18 104/56 (72) 96 Room Air  


 


3/3/22 13:32  106 18 101/64 (76) 99 Room Air  


 


3/3/22 13:28  75 18 99/57 (71) 99 Room Air  


 


3/3/22 13:25 36.3 84 18 101/57 (72) 99 Room Air  


 


3/3/22 13:23  95 18 95/54 (68) 100 Room Air  


 


3/3/22 13:20  93 18 98/53 (68) 100 Room Air  


 


3/3/22 13:17  85 18 105/59 (74) 100 Room Air  


 


3/3/22 13:14  69 18 95/57 (70) 100 Room Air  


 


3/3/22 13:11  66 18 100/57 (71) 98 Room Air  


 


3/3/22 13:10  70 18 93/57 (69) 98 Room Air  


 


3/3/22 13:06  50 18 99/57 (71) 93 Room Air  


 


3/3/22 13:04  70 18 74/44 (54) 97 Room Air  


 


3/3/22 13:02  74 18 74/41 (52) 97 Room Air  


 


3/3/22 13:00  88 18 92/51 (65) 98 Room Air  


 


3/3/22 12:55  110 18 105/60 (75) 99 Room Air  


 


3/3/22 12:49  75 18 118/75 (89) 99 Room Air  


 


3/3/22 12:46  73 18 118/73 (88) 99 Room Air  


 


3/3/22 12:43  78 18 115/74 (88) 99 Room Air  


 


3/3/22 12:38  75 18 113/73 (86) 100 Room Air  


 


3/3/22 12:15  88 18 119/63 (81)  Room Air  


 


3/3/22 12:00 36.3 80 18 117/59 (78)  Room Air  


 


3/3/22 11:43  70 18 116/56 (76)  Room Air  


 


3/3/22 11:28  70 18 119/76 (90)  Room Air  


 


3/3/22 11:13  80 18 117/74 (88)  Room Air  


 


3/3/22 10:59  75 18 118/66 (83)  Room Air  














I & O 


 


 3/4/22





 07:00


 


Intake Total 1064.8 ml


 


Balance 1064.8 ml

















YARA ESTRADA MD        Mar 4, 2022 10:57

## 2022-03-04 NOTE — ANESTHESIA-REGIONAL POST-OP
Regional


Patient Condition


Mental Status:  Alert, Oriented x3


Circulation:  Same as Pre-Op


Headache:  Absent


Sensation:  Full Recovery


Motor Block:  Absent





Post Op Complications


Complications


None





Follow Up Care/Instructions


Patient Instructions


None needed.





Anesthesia/Patient Condition


Patient is doing well, no complaints, stable vital signs, no apparent adverse 

anesthesia problems.   


No complications reported per nursing.











ANDERSON CABRAL CRNA             Mar 4, 2022 10:40

## 2022-03-05 VITALS — SYSTOLIC BLOOD PRESSURE: 117 MMHG | DIASTOLIC BLOOD PRESSURE: 60 MMHG

## 2022-03-05 VITALS — DIASTOLIC BLOOD PRESSURE: 59 MMHG | SYSTOLIC BLOOD PRESSURE: 103 MMHG

## 2022-03-05 VITALS — DIASTOLIC BLOOD PRESSURE: 59 MMHG | SYSTOLIC BLOOD PRESSURE: 102 MMHG

## 2022-03-05 RX ADMIN — IBUPROFEN SCH MG: 800 TABLET, FILM COATED ORAL at 03:28

## 2022-03-05 RX ADMIN — IBUPROFEN SCH MG: 800 TABLET, FILM COATED ORAL at 15:29

## 2022-03-05 RX ADMIN — IBUPROFEN SCH MG: 800 TABLET, FILM COATED ORAL at 09:07

## 2022-03-05 RX ADMIN — DOCUSATE SODIUM SCH MG: 100 CAPSULE ORAL at 09:07

## 2022-03-05 RX ADMIN — ACETAMINOPHEN SCH MG: 500 TABLET ORAL at 03:28

## 2022-03-05 NOTE — PROGRESS NOTE
Standard Progress Note


Progress Notes/Assess & Plan


Date Seen by a Provider:  Mar 5, 2022


Time Seen by a Provider:  10:50


Progress/Assessment & Plan


This patient is without complaint.  She is ambulating, voiding, tolerating oral 

intake well has good pain control.








                          VS - Last 72 Hours, by Label








 3/3/22 3/3/22 3/3/22 3/3/22





 07:30 08:00 09:04 10:13


 


Temp 36.4 36.4  


 


Pulse 105 105 80 76


 


Resp 18 18 18 18


 


B/P (MAP)  134/64 (87) 123/68 (86) 116/61 (79)


 


Pulse Ox 100 100  


 


O2 Delivery Room Air Room Air Room Air Room Air


 


    





 3/3/22 3/3/22 3/3/22 3/3/22





 10:29 10:45 10:59 11:13


 


Pulse 81 81 75 80


 


Resp 18 18 18 18


 


B/P (MAP) 126/73 (90) 116/67 (83) 118/66 (83) 117/74 (88)


 


O2 Delivery Room Air Room Air Room Air Room Air





 3/3/22 3/3/22 3/3/22 3/3/22





 11:28 11:43 12:00 12:15


 


Temp   36.3 


 


Pulse 70 70 80 88


 


Resp 18 18 18 18


 


B/P (MAP) 119/76 (90) 116/56 (76) 117/59 (78) 119/63 (81)


 


O2 Delivery Room Air Room Air Room Air Room Air


 


    





 3/3/22 3/3/22 3/3/22 3/3/22





 12:38 12:43 12:46 12:49


 


Pulse 75 78 73 75


 


Resp 18 18 18 18


 


B/P (MAP) 113/73 (86) 115/74 (88) 118/73 (88) 118/75 (89)


 


Pulse Ox 100 99 99 99


 


O2 Delivery Room Air Room Air Room Air Room Air





 3/3/22 3/3/22 3/3/22 3/3/22





 12:55 13:00 13:02 13:04


 


Pulse 110 88 74 70


 


Resp 18 18 18 18


 


B/P (MAP) 105/60 (75) 92/51 (65) 74/41 (52) 74/44 (54)


 


Pulse Ox 99 98 97 97


 


O2 Delivery Room Air Room Air Room Air Room Air





 3/3/22 3/3/22 3/3/22 3/3/22





 13:06 13:10 13:11 13:14


 


Pulse 50 70 66 69


 


Resp 18 18 18 18


 


B/P (MAP) 99/57 (71) 93/57 (69) 100/57 (71) 95/57 (70)


 


Pulse Ox 93 98 98 100


 


O2 Delivery Room Air Room Air Room Air Room Air





 3/3/22 3/3/22 3/3/22 3/3/22





 13:17 13:20 13:23 13:25


 


Temp    36.3


 


Pulse 85 93 95 84


 


Resp 18 18 18 18


 


B/P (MAP) 105/59 (74) 98/53 (68) 95/54 (68) 101/57 (72)


 


Pulse Ox 100 100 100 99


 


O2 Delivery Room Air Room Air Room Air Room Air


 


    





 3/3/22 3/3/22 3/3/22 3/3/22





 13:28 13:32 13:39 13:43


 


Pulse 75 106 83 85


 


Resp 18 18 18 18


 


B/P (MAP) 99/57 (71) 101/64 (76) 104/56 (72) 111/57 (75)


 


Pulse Ox 99 99 96 92


 


O2 Delivery Room Air Room Air Room Air Room Air





 3/3/22 3/3/22 3/3/22 3/3/22





 13:49 13:55 14:03 14:08


 


Pulse 65 87 77 91


 


Resp 18 18 18 18


 


B/P (MAP) 128/72 (90) 118/54 (75) 112/59 (76) 98/68 (78)


 


Pulse Ox 100 96 92 99


 


O2 Delivery Room Air Room Air Room Air Room Air





 3/3/22 3/3/22 3/3/22 3/3/22





 14:29 14:44 14:59 15:00


 


Pulse 93 72 73 86


 


Resp 18 18 18 18


 


B/P (MAP) 99/59 (72) 96/58 (71) 101/57 (72) 110/78 (89)


 


O2 Delivery Room Air Room Air Room Air Room Air





 3/3/22 3/3/22 3/3/22 3/3/22





 15:29 15:43 16:29 16:44


 


Pulse 71 96 103 78


 


Resp 18 18 18 18


 


B/P (MAP) 105/54 (71) 106/55 (72) 103/60 (74) 113/68 (83)


 


O2 Delivery Room Air Room Air Room Air Non Rebreather


 


O2 Flow Rate    15.00





 3/3/22 3/3/22 3/3/22 3/3/22





 17:00 17:13 17:28 17:44


 


Temp  36.1  


 


Pulse 84 75 100 81


 


Resp 18 18 18 18


 


B/P (MAP) 117/64 (81) 113/60 (77) 118/71 (87) 118/70 (86)


 


O2 Delivery Non Rebreather Non Rebreather Non Rebreather Non Rebreather


 


O2 Flow Rate 15.00 15.00 15.00 15.00


 


    





 3/3/22 3/3/22 3/3/22 3/3/22





 17:59 18:13 18:34 18:43


 


Temp  36.2  


 


Pulse 73 98 97 108


 


Resp 18 18 18 18


 


B/P (MAP) 124/68 (86) 128/79 (95) 132/93 (106) 128/60 (82)


 


O2 Delivery Non Rebreather Non Rebreather Room Air Room Air


 


O2 Flow Rate 15.00 15.00  


 


    





 3/3/22 3/3/22 3/3/22 3/3/22





 18:57 19:12 19:27 19:57


 


Pulse 101 104 95 91


 


Resp 18   


 


B/P (MAP) 124/58 (80) 128/60 (82) 124/72 (89) 108/54 (72)


 


O2 Delivery Room Air   





 3/3/22 3/3/22 3/4/22 3/4/22





 20:12 20:27 00:58 04:30


 


Temp  36.8  36.5


 


Pulse 95 99 76 63


 


Resp    18


 


B/P (MAP) 107/56 (73) 112/55 (74) 104/58 (73) 115/59 (77)





Vital signs are stable.  Patient is afebrile.





Fundus is firm below the umbilicus and nontender.


Extremities show no clubbing cyanosis.  There is no Homans' sign.





Assessment and plan   Postpartum day #1 status post term spontaneous vaginal 

livery doing well.  Plans for routine convalescent care





March 5, 2022


Patient is without complaint.  She is ambulating, voiding, tolerating oral 

intake well and has good pain control








                          VS - Last 72 Hours, by Label








 3/3/22 3/3/22 3/3/22 3/3/22





 07:30 08:00 09:04 10:13


 


Temp 36.4 36.4  


 


Pulse 105 105 80 76


 


Resp 18 18 18 18


 


B/P (MAP)  134/64 (87) 123/68 (86) 116/61 (79)


 


Pulse Ox 100 100  


 


O2 Delivery Room Air Room Air Room Air Room Air


 


    





 3/3/22 3/3/22 3/3/22 3/3/22





 10:29 10:45 10:59 11:13


 


Pulse 81 81 75 80


 


Resp 18 18 18 18


 


B/P (MAP) 126/73 (90) 116/67 (83) 118/66 (83) 117/74 (88)


 


O2 Delivery Room Air Room Air Room Air Room Air





 3/3/22 3/3/22 3/3/22 3/3/22





 11:28 11:43 12:00 12:15


 


Temp   36.3 


 


Pulse 70 70 80 88


 


Resp 18 18 18 18


 


B/P (MAP) 119/76 (90) 116/56 (76) 117/59 (78) 119/63 (81)


 


O2 Delivery Room Air Room Air Room Air Room Air


 


    





 3/3/22 3/3/22 3/3/22 3/3/22





 12:38 12:43 12:46 12:49


 


Pulse 75 78 73 75


 


Resp 18 18 18 18


 


B/P (MAP) 113/73 (86) 115/74 (88) 118/73 (88) 118/75 (89)


 


Pulse Ox 100 99 99 99


 


O2 Delivery Room Air Room Air Room Air Room Air





 3/3/22 3/3/22 3/3/22 3/3/22





 12:55 13:00 13:02 13:04


 


Pulse 110 88 74 70


 


Resp 18 18 18 18


 


B/P (MAP) 105/60 (75) 92/51 (65) 74/41 (52) 74/44 (54)


 


Pulse Ox 99 98 97 97


 


O2 Delivery Room Air Room Air Room Air Room Air





 3/3/22 3/3/22 3/3/22 3/3/22





 13:06 13:10 13:11 13:14


 


Pulse 50 70 66 69


 


Resp 18 18 18 18


 


B/P (MAP) 99/57 (71) 93/57 (69) 100/57 (71) 95/57 (70)


 


Pulse Ox 93 98 98 100


 


O2 Delivery Room Air Room Air Room Air Room Air





 3/3/22 3/3/22 3/3/22 3/3/22





 13:17 13:20 13:23 13:25


 


Temp    36.3


 


Pulse 85 93 95 84


 


Resp 18 18 18 18


 


B/P (MAP) 105/59 (74) 98/53 (68) 95/54 (68) 101/57 (72)


 


Pulse Ox 100 100 100 99


 


O2 Delivery Room Air Room Air Room Air Room Air


 


    





 3/3/22 3/3/22 3/3/22 3/3/22





 13:28 13:32 13:39 13:43


 


Pulse 75 106 83 85


 


Resp 18 18 18 18


 


B/P (MAP) 99/57 (71) 101/64 (76) 104/56 (72) 111/57 (75)


 


Pulse Ox 99 99 96 92


 


O2 Delivery Room Air Room Air Room Air Room Air





 3/3/22 3/3/22 3/3/22 3/3/22





 13:49 13:55 14:03 14:08


 


Pulse 65 87 77 91


 


Resp 18 18 18 18


 


B/P (MAP) 128/72 (90) 118/54 (75) 112/59 (76) 98/68 (78)


 


Pulse Ox 100 96 92 99


 


O2 Delivery Room Air Room Air Room Air Room Air





 3/3/22 3/3/22 3/3/22 3/3/22





 14:29 14:44 14:59 15:00


 


Pulse 93 72 73 86


 


Resp 18 18 18 18


 


B/P (MAP) 99/59 (72) 96/58 (71) 101/57 (72) 110/78 (89)


 


O2 Delivery Room Air Room Air Room Air Room Air





 3/3/22 3/3/22 3/3/22 3/3/22





 15:29 15:43 16:29 16:44


 


Pulse 71 96 103 78


 


Resp 18 18 18 18


 


B/P (MAP) 105/54 (71) 106/55 (72) 103/60 (74) 113/68 (83)


 


O2 Delivery Room Air Room Air Room Air Non Rebreather


 


O2 Flow Rate    15.00





 3/3/22 3/3/22 3/3/22 3/3/22





 17:00 17:13 17:28 17:44


 


Temp  36.1  


 


Pulse 84 75 100 81


 


Resp 18 18 18 18


 


B/P (MAP) 117/64 (81) 113/60 (77) 118/71 (87) 118/70 (86)


 


O2 Delivery Non Rebreather Non Rebreather Non Rebreather Non Rebreather


 


O2 Flow Rate 15.00 15.00 15.00 15.00


 


    





 3/3/22 3/3/22 3/3/22 3/3/22





 17:59 18:13 18:34 18:43


 


Temp  36.2  


 


Pulse 73 98 97 108


 


Resp 18 18 18 18


 


B/P (MAP) 124/68 (86) 128/79 (95) 132/93 (106) 128/60 (82)


 


O2 Delivery Non Rebreather Non Rebreather Room Air Room Air


 


O2 Flow Rate 15.00 15.00  


 


    





 3/3/22 3/3/22 3/3/22 3/3/22





 18:57 19:12 19:27 19:57


 


Pulse 101 104 95 91


 


Resp 18   


 


B/P (MAP) 124/58 (80) 128/60 (82) 124/72 (89) 108/54 (72)


 


O2 Delivery Room Air   





 3/3/22 3/3/22 3/4/22 3/4/22





 20:12 20:27 00:58 04:30


 


Temp  36.8  36.5


 


Pulse 95 99 76 63


 


Resp    18


 


B/P (MAP) 107/56 (73) 112/55 (74) 104/58 (73) 115/59 (77)


 


    





 3/4/22 3/4/22 3/4/22 3/4/22





 09:00 12:45 16:15 21:26


 


Temp 36.6 36.8 36.7 36.6


 


Pulse 83 73 86 74


 


Resp 18 18 18 18


 


B/P (MAP) 114/56 (75) 105/50 (68) 116/55 (75) 112/57 (75)


 


Pulse Ox 100 97 98 98


 


O2 Delivery Room Air Room Air Room Air Room Air


 


    





 3/5/22 3/5/22  





 03:28 09:11  


 


Temp 36.6 37.1  


 


Pulse 66 63  


 


Resp 18 18  


 


B/P (MAP) 102/59 (73) 103/59 (74)  


 


Pulse Ox  97  


 


O2 Delivery Room Air Room Air  





Vital signs are stable.  Patient is afebrile.





Abdomen is benign


Extremities show no clubbing or cyanosis.  There is no Homans' sign.





Assessment and plan


Post partum day #2 status post term spontaneous vaginal delivery doing well.  

Plan is for discharge home with follow-up in clinic.  Patient does report that 

Baby did sustain a Fractured right clavicle


Final Diagnosis


39-week spontaneous vaginal delivery











YARA ESTRADA MD        Mar 5, 2022 10:52